# Patient Record
Sex: MALE | NOT HISPANIC OR LATINO
[De-identification: names, ages, dates, MRNs, and addresses within clinical notes are randomized per-mention and may not be internally consistent; named-entity substitution may affect disease eponyms.]

---

## 2017-12-13 PROBLEM — Z00.00 ENCOUNTER FOR PREVENTIVE HEALTH EXAMINATION: Status: ACTIVE | Noted: 2017-12-13

## 2018-01-04 ENCOUNTER — APPOINTMENT (OUTPATIENT)
Dept: UROLOGY | Facility: CLINIC | Age: 68
End: 2018-01-04
Payer: MEDICARE

## 2018-01-04 VITALS
HEART RATE: 105 BPM | BODY MASS INDEX: 26.49 KG/M2 | HEIGHT: 65 IN | WEIGHT: 159 LBS | SYSTOLIC BLOOD PRESSURE: 149 MMHG | RESPIRATION RATE: 17 BRPM | TEMPERATURE: 99.4 F | DIASTOLIC BLOOD PRESSURE: 88 MMHG

## 2018-01-04 DIAGNOSIS — Z80.8 FAMILY HISTORY OF MALIGNANT NEOPLASM OF OTHER ORGANS OR SYSTEMS: ICD-10-CM

## 2018-01-04 DIAGNOSIS — Z86.39 PERSONAL HISTORY OF OTHER ENDOCRINE, NUTRITIONAL AND METABOLIC DISEASE: ICD-10-CM

## 2018-01-04 DIAGNOSIS — R82.99 OTHER ABNORMAL FINDINGS IN URINE: ICD-10-CM

## 2018-01-04 DIAGNOSIS — Z86.79 PERSONAL HISTORY OF OTHER DISEASES OF THE CIRCULATORY SYSTEM: ICD-10-CM

## 2018-01-04 DIAGNOSIS — Z87.09 PERSONAL HISTORY OF OTHER DISEASES OF THE RESPIRATORY SYSTEM: ICD-10-CM

## 2018-01-04 DIAGNOSIS — Z80.1 FAMILY HISTORY OF MALIGNANT NEOPLASM OF TRACHEA, BRONCHUS AND LUNG: ICD-10-CM

## 2018-01-04 DIAGNOSIS — Z82.49 FAMILY HISTORY OF ISCHEMIC HEART DISEASE AND OTHER DISEASES OF THE CIRCULATORY SYSTEM: ICD-10-CM

## 2018-01-04 DIAGNOSIS — Z78.9 OTHER SPECIFIED HEALTH STATUS: ICD-10-CM

## 2018-01-04 DIAGNOSIS — Z87.891 PERSONAL HISTORY OF NICOTINE DEPENDENCE: ICD-10-CM

## 2018-01-04 PROCEDURE — 99204 OFFICE O/P NEW MOD 45 MIN: CPT

## 2018-01-04 RX ORDER — SAXAGLIPTIN AND METFORMIN HYDROCHLORIDE 5; 1000 MG/1; MG/1
5-1000 TABLET, FILM COATED, EXTENDED RELEASE ORAL
Refills: 0 | Status: ACTIVE | COMMUNITY

## 2018-01-04 RX ORDER — TURMERIC ROOT EXTRACT 500 MG
TABLET ORAL
Refills: 0 | Status: ACTIVE | COMMUNITY

## 2018-01-04 RX ORDER — LOSARTAN POTASSIUM 100 MG/1
100 TABLET, FILM COATED ORAL
Refills: 0 | Status: ACTIVE | COMMUNITY

## 2018-01-04 RX ORDER — UBIDECARENONE/VIT E ACET 100MG-5
1000 CAPSULE ORAL
Refills: 0 | Status: ACTIVE | COMMUNITY

## 2018-01-04 RX ORDER — UBIQUINOL 100 MG
CAPSULE ORAL
Refills: 0 | Status: ACTIVE | COMMUNITY

## 2018-01-04 RX ORDER — ATORVASTATIN CALCIUM 10 MG/1
10 TABLET, FILM COATED ORAL
Refills: 0 | Status: ACTIVE | COMMUNITY

## 2018-01-04 RX ORDER — ALLOPURINOL 300 MG/1
300 TABLET ORAL
Refills: 0 | Status: ACTIVE | COMMUNITY

## 2018-01-04 RX ORDER — AMLODIPINE BESYLATE 10 MG/1
10 TABLET ORAL
Refills: 0 | Status: ACTIVE | COMMUNITY

## 2018-01-16 ENCOUNTER — APPOINTMENT (OUTPATIENT)
Dept: UROLOGY | Facility: CLINIC | Age: 68
End: 2018-01-16
Payer: MEDICARE

## 2018-01-16 ENCOUNTER — OUTPATIENT (OUTPATIENT)
Dept: OUTPATIENT SERVICES | Facility: HOSPITAL | Age: 68
LOS: 1 days | End: 2018-01-16
Payer: COMMERCIAL

## 2018-01-16 VITALS
RESPIRATION RATE: 16 BRPM | SYSTOLIC BLOOD PRESSURE: 155 MMHG | HEART RATE: 83 BPM | DIASTOLIC BLOOD PRESSURE: 95 MMHG | TEMPERATURE: 98.4 F

## 2018-01-16 DIAGNOSIS — R35.0 FREQUENCY OF MICTURITION: ICD-10-CM

## 2018-01-16 LAB
BILIRUB UR QL STRIP: NORMAL
CLARITY UR: CLEAR
COLLECTION METHOD: NORMAL
GLUCOSE UR-MCNC: NORMAL
HCG UR QL: 0.2 EU/DL
HGB UR QL STRIP.AUTO: NORMAL
KETONES UR-MCNC: NORMAL
LEUKOCYTE ESTERASE UR QL STRIP: NORMAL
NITRITE UR QL STRIP: NORMAL
PH UR STRIP: 6.5
PROT UR STRIP-MCNC: NORMAL
SP GR UR STRIP: 1.02

## 2018-01-16 PROCEDURE — 51797 INTRAABDOMINAL PRESSURE TEST: CPT

## 2018-01-16 PROCEDURE — 51797 INTRAABDOMINAL PRESSURE TEST: CPT | Mod: 26

## 2018-01-16 PROCEDURE — 51728 CYSTOMETROGRAM W/VP: CPT | Mod: 26

## 2018-01-16 PROCEDURE — 51741 ELECTRO-UROFLOWMETRY FIRST: CPT | Mod: 26

## 2018-01-16 PROCEDURE — 99214 OFFICE O/P EST MOD 30 MIN: CPT | Mod: 25

## 2018-01-16 PROCEDURE — 52000 CYSTOURETHROSCOPY: CPT

## 2018-01-16 PROCEDURE — 51741 ELECTRO-UROFLOWMETRY FIRST: CPT

## 2018-01-16 PROCEDURE — 51784 ANAL/URINARY MUSCLE STUDY: CPT | Mod: 26

## 2018-01-16 PROCEDURE — 51784 ANAL/URINARY MUSCLE STUDY: CPT

## 2018-01-16 PROCEDURE — 51728 CYSTOMETROGRAM W/VP: CPT

## 2018-01-29 DIAGNOSIS — N40.1 BENIGN PROSTATIC HYPERPLASIA WITH LOWER URINARY TRACT SYMPTOMS: ICD-10-CM

## 2018-03-30 ENCOUNTER — OUTPATIENT (OUTPATIENT)
Dept: OUTPATIENT SERVICES | Facility: HOSPITAL | Age: 68
LOS: 1 days | End: 2018-03-30
Payer: MEDICARE

## 2018-03-30 VITALS
RESPIRATION RATE: 16 BRPM | SYSTOLIC BLOOD PRESSURE: 140 MMHG | WEIGHT: 162.04 LBS | DIASTOLIC BLOOD PRESSURE: 81 MMHG | TEMPERATURE: 97 F | HEIGHT: 64 IN | HEART RATE: 89 BPM

## 2018-03-30 DIAGNOSIS — I10 ESSENTIAL (PRIMARY) HYPERTENSION: ICD-10-CM

## 2018-03-30 DIAGNOSIS — E11.9 TYPE 2 DIABETES MELLITUS WITHOUT COMPLICATIONS: ICD-10-CM

## 2018-03-30 DIAGNOSIS — Z98.890 OTHER SPECIFIED POSTPROCEDURAL STATES: Chronic | ICD-10-CM

## 2018-03-30 DIAGNOSIS — N40.1 BENIGN PROSTATIC HYPERPLASIA WITH LOWER URINARY TRACT SYMPTOMS: ICD-10-CM

## 2018-03-30 LAB
APPEARANCE UR: CLEAR — SIGNIFICANT CHANGE UP
BILIRUB UR-MCNC: NEGATIVE — SIGNIFICANT CHANGE UP
BLD GP AB SCN SERPL QL: NEGATIVE — SIGNIFICANT CHANGE UP
BLOOD UR QL VISUAL: NEGATIVE — SIGNIFICANT CHANGE UP
BUN SERPL-MCNC: 14 MG/DL — SIGNIFICANT CHANGE UP (ref 7–23)
CALCIUM SERPL-MCNC: 8.9 MG/DL — SIGNIFICANT CHANGE UP (ref 8.4–10.5)
CHLORIDE SERPL-SCNC: 102 MMOL/L — SIGNIFICANT CHANGE UP (ref 98–107)
CO2 SERPL-SCNC: 26 MMOL/L — SIGNIFICANT CHANGE UP (ref 22–31)
COLOR SPEC: SIGNIFICANT CHANGE UP
CREAT SERPL-MCNC: 0.81 MG/DL — SIGNIFICANT CHANGE UP (ref 0.5–1.3)
GLUCOSE SERPL-MCNC: 122 MG/DL — HIGH (ref 70–99)
GLUCOSE UR-MCNC: NEGATIVE — SIGNIFICANT CHANGE UP
HBA1C BLD-MCNC: 6.2 % — HIGH (ref 4–5.6)
HCT VFR BLD CALC: 41.4 % — SIGNIFICANT CHANGE UP (ref 39–50)
HGB BLD-MCNC: 13.9 G/DL — SIGNIFICANT CHANGE UP (ref 13–17)
KETONES UR-MCNC: NEGATIVE — SIGNIFICANT CHANGE UP
LEUKOCYTE ESTERASE UR-ACNC: NEGATIVE — SIGNIFICANT CHANGE UP
MCHC RBC-ENTMCNC: 30.5 PG — SIGNIFICANT CHANGE UP (ref 27–34)
MCHC RBC-ENTMCNC: 33.6 % — SIGNIFICANT CHANGE UP (ref 32–36)
MCV RBC AUTO: 90.8 FL — SIGNIFICANT CHANGE UP (ref 80–100)
MUCOUS THREADS # UR AUTO: SIGNIFICANT CHANGE UP
NITRITE UR-MCNC: NEGATIVE — SIGNIFICANT CHANGE UP
NRBC # FLD: 0 — SIGNIFICANT CHANGE UP
PH UR: 6 — SIGNIFICANT CHANGE UP (ref 4.6–8)
PLATELET # BLD AUTO: 237 K/UL — SIGNIFICANT CHANGE UP (ref 150–400)
PMV BLD: 10.5 FL — SIGNIFICANT CHANGE UP (ref 7–13)
POTASSIUM SERPL-MCNC: 3.6 MMOL/L — SIGNIFICANT CHANGE UP (ref 3.5–5.3)
POTASSIUM SERPL-SCNC: 3.6 MMOL/L — SIGNIFICANT CHANGE UP (ref 3.5–5.3)
PROT UR-MCNC: NEGATIVE MG/DL — SIGNIFICANT CHANGE UP
RBC # BLD: 4.56 M/UL — SIGNIFICANT CHANGE UP (ref 4.2–5.8)
RBC # FLD: 13 % — SIGNIFICANT CHANGE UP (ref 10.3–14.5)
RBC CASTS # UR COMP ASSIST: SIGNIFICANT CHANGE UP (ref 0–?)
RH IG SCN BLD-IMP: POSITIVE — SIGNIFICANT CHANGE UP
SODIUM SERPL-SCNC: 140 MMOL/L — SIGNIFICANT CHANGE UP (ref 135–145)
SP GR SPEC: 1.02 — SIGNIFICANT CHANGE UP (ref 1–1.04)
UROBILINOGEN FLD QL: NORMAL MG/DL — SIGNIFICANT CHANGE UP
WBC # BLD: 6.34 K/UL — SIGNIFICANT CHANGE UP (ref 3.8–10.5)
WBC # FLD AUTO: 6.34 K/UL — SIGNIFICANT CHANGE UP (ref 3.8–10.5)
WBC UR QL: SIGNIFICANT CHANGE UP (ref 0–?)

## 2018-03-30 PROCEDURE — 93010 ELECTROCARDIOGRAM REPORT: CPT

## 2018-03-30 RX ORDER — AMLODIPINE BESYLATE 2.5 MG/1
1 TABLET ORAL
Qty: 0 | Refills: 0 | COMMUNITY

## 2018-03-30 NOTE — H&P PST ADULT - NEGATIVE NEUROLOGICAL SYMPTOMS
no loss of consciousness/no transient paralysis/no tremors/no loss of sensation/no headache/no syncope/no difficulty walking/no weakness/no paresthesias/no generalized seizures/no vertigo/no focal seizures/no hemiparesis

## 2018-03-30 NOTE — H&P PST ADULT - PROBLEM SELECTOR PLAN 2
instructed patient to take losartan with a sip of water on the morning of procedure instructed patient to take losartan and amlodipine with a sip of water on the morning of procedure

## 2018-03-30 NOTE — H&P PST ADULT - PMH
2010 thyroid nodule/Mass  s/p partial thyroidectomy  BPH (Benign Prostatic Hypertrophy)    Cataracts, bilateral  small - monitored  Diabetes  type 2  Enlarged prostate with lower urinary tract symptoms (LUTS)    h/o bladder Stones  s/p lithotripsy  Hayfever    HLD (hyperlipidemia)    HTN (hypertension)    Hypercholesterolemia    Hypertension    Native language [place]--pt comprehends/verbalizes English well 2010 thyroid nodule/Mass  s/p partial thyroidectomy  BPH (Benign Prostatic Hypertrophy)    Cataracts, bilateral  small - monitored  Diabetes  type 2  Enlarged prostate with lower urinary tract symptoms (LUTS)    h/o bladder Stones  s/p lithotripsy  Hayfever    Hypercholesterolemia    Hypertension

## 2018-03-30 NOTE — H&P PST ADULT - NEGATIVE ENMT SYMPTOMS
no ear pain/no tinnitus/no nose bleeds/no hearing difficulty/no vertigo/no sinus symptoms/no throat pain/no dysphagia

## 2018-03-30 NOTE — H&P PST ADULT - NSANTHOSAYNRD_GEN_A_CORE
No. KWABENA screening performed.  STOP BANG Legend: 0-2 = LOW Risk; 3-4 = INTERMEDIATE Risk; 5-8 = HIGH Risk

## 2018-03-30 NOTE — H&P PST ADULT - RS GEN PE MLT RESP DETAILS PC
no rales/good air movement/breath sounds equal/respirations non-labored/clear to auscultation bilaterally/no wheezes/airway patent/no chest wall tenderness/no rhonchi

## 2018-03-30 NOTE — H&P PST ADULT - MUSCULOSKELETAL
details… detailed exam ROM intact/no joint swelling/no joint warmth/no calf tenderness/normal strength/no joint erythema

## 2018-03-30 NOTE — H&P PST ADULT - HISTORY OF PRESENT ILLNESS
67 year old male presents to presurgical testing with diagnosis of enlarged prostate with lower urinary tract symptoms scheduled for cystoscopy transureteral resection of prostate for 4/6/18. Pt reports Hx of BPH, diagnosed over 5 years ago, with persistent symptoms despite conservative management with medication, associated with urinary frequency, nocturia, and incomplete bladder emptying. Pt denies UTI's hematuria or flank pain.

## 2018-03-30 NOTE — H&P PST ADULT - NEGATIVE OPHTHALMOLOGIC SYMPTOMS
no diplopia/no blurred vision L/no discharge L/no pain L/no pain R/no loss of vision L/no loss of vision R/no discharge R/no photophobia/no blurred vision R

## 2018-03-30 NOTE — H&P PST ADULT - NEGATIVE GENERAL SYMPTOMS
no fever/no sweating/no anorexia/no weight gain/no polyuria/no chills/no polydipsia/no weight loss/no polyphagia/no fatigue/no malaise

## 2018-03-30 NOTE — H&P PST ADULT - PROBLEM SELECTOR PLAN 1
Pt is scheduled for cystoscopy transureteral resection of prostate for 4/6/18. Preop instructions, pepcid provided. Pt stated understanding. KWABENA precaution, OR booking notified. Pending medical evaluation due to Hx of HTN DM type2 and copy of last echo and stress test. Instructed patient to make an appt with PMD

## 2018-03-30 NOTE — H&P PST ADULT - PSH
2004 and 2005 removal of bladder Stones--? possible lithotripsy    H/O partial thyroidectomy  2010  S/P Cholecystectomy

## 2018-03-30 NOTE — H&P PST ADULT - PROBLEM SELECTOR PLAN 3
take last dose of kombyglyze on 4/5/18 AM. Accu check ordered on morning of procedure. take last dose of Kombiglyze on 4/5/18 AM. Accu check ordered on morning of procedure. last dose of aspirin 3/29/18

## 2018-03-31 LAB
BACTERIA UR CULT: SIGNIFICANT CHANGE UP
SPECIMEN SOURCE: SIGNIFICANT CHANGE UP

## 2018-04-05 ENCOUNTER — TRANSCRIPTION ENCOUNTER (OUTPATIENT)
Age: 68
End: 2018-04-05

## 2018-04-05 NOTE — ASU PATIENT PROFILE, ADULT - PMH
2010 thyroid nodule/Mass  s/p partial thyroidectomy  BPH (Benign Prostatic Hypertrophy)    Cataracts, bilateral  small - monitored  Diabetes  type 2  Enlarged prostate with lower urinary tract symptoms (LUTS)    h/o bladder Stones  s/p lithotripsy  Hayfever    Hypercholesterolemia    Hypertension

## 2018-04-05 NOTE — ASU PATIENT PROFILE, ADULT - ANESTHESIA, PREVIOUS REACTION, PROFILE
denies family hx of malignant hyperthermia/nausea/vomiting denies family hx of malignant hyperthermia/none/nausea/vomiting

## 2018-04-06 ENCOUNTER — APPOINTMENT (OUTPATIENT)
Dept: UROLOGY | Facility: HOSPITAL | Age: 68
End: 2018-04-06

## 2018-04-06 ENCOUNTER — RESULT REVIEW (OUTPATIENT)
Age: 68
End: 2018-04-06

## 2018-04-06 ENCOUNTER — INPATIENT (INPATIENT)
Facility: HOSPITAL | Age: 68
LOS: 0 days | Discharge: HOME | End: 2018-04-07
Attending: UROLOGY | Admitting: UROLOGY
Payer: MEDICARE

## 2018-04-06 VITALS
SYSTOLIC BLOOD PRESSURE: 155 MMHG | HEART RATE: 95 BPM | RESPIRATION RATE: 16 BRPM | TEMPERATURE: 98 F | HEIGHT: 64 IN | OXYGEN SATURATION: 95 % | DIASTOLIC BLOOD PRESSURE: 85 MMHG | WEIGHT: 162.04 LBS

## 2018-04-06 DIAGNOSIS — N40.1 BENIGN PROSTATIC HYPERPLASIA WITH LOWER URINARY TRACT SYMPTOMS: ICD-10-CM

## 2018-04-06 DIAGNOSIS — Z98.890 OTHER SPECIFIED POSTPROCEDURAL STATES: Chronic | ICD-10-CM

## 2018-04-06 LAB
BUN SERPL-MCNC: 19 MG/DL — SIGNIFICANT CHANGE UP (ref 7–23)
CALCIUM SERPL-MCNC: 8.4 MG/DL — SIGNIFICANT CHANGE UP (ref 8.4–10.5)
CHLORIDE SERPL-SCNC: 100 MMOL/L — SIGNIFICANT CHANGE UP (ref 98–107)
CO2 SERPL-SCNC: 26 MMOL/L — SIGNIFICANT CHANGE UP (ref 22–31)
CREAT SERPL-MCNC: 1.06 MG/DL — SIGNIFICANT CHANGE UP (ref 0.5–1.3)
GLUCOSE BLDC GLUCOMTR-MCNC: 150 MG/DL — HIGH (ref 70–99)
GLUCOSE BLDV-MCNC: 142 — HIGH (ref 70–99)
GLUCOSE SERPL-MCNC: 183 MG/DL — HIGH (ref 70–99)
HCT VFR BLD CALC: 37.8 % — LOW (ref 39–50)
HGB BLD-MCNC: 12.8 G/DL — LOW (ref 13–17)
MCHC RBC-ENTMCNC: 30.5 PG — SIGNIFICANT CHANGE UP (ref 27–34)
MCHC RBC-ENTMCNC: 33.9 % — SIGNIFICANT CHANGE UP (ref 32–36)
MCV RBC AUTO: 90.2 FL — SIGNIFICANT CHANGE UP (ref 80–100)
NRBC # FLD: 0 — SIGNIFICANT CHANGE UP
PLATELET # BLD AUTO: 194 K/UL — SIGNIFICANT CHANGE UP (ref 150–400)
PMV BLD: 10.6 FL — SIGNIFICANT CHANGE UP (ref 7–13)
POTASSIUM BLDV-SCNC: 4.1 MMOL/L — SIGNIFICANT CHANGE UP (ref 3.4–4.5)
POTASSIUM SERPL-MCNC: 4.8 MMOL/L — SIGNIFICANT CHANGE UP (ref 3.5–5.3)
POTASSIUM SERPL-SCNC: 4.8 MMOL/L — SIGNIFICANT CHANGE UP (ref 3.5–5.3)
RBC # BLD: 4.19 M/UL — LOW (ref 4.2–5.8)
RBC # FLD: 12.9 % — SIGNIFICANT CHANGE UP (ref 10.3–14.5)
RH IG SCN BLD-IMP: POSITIVE — SIGNIFICANT CHANGE UP
SODIUM SERPL-SCNC: 135 MMOL/L — SIGNIFICANT CHANGE UP (ref 135–145)
WBC # BLD: 11.66 K/UL — HIGH (ref 3.8–10.5)
WBC # FLD AUTO: 11.66 K/UL — HIGH (ref 3.8–10.5)

## 2018-04-06 PROCEDURE — 88305 TISSUE EXAM BY PATHOLOGIST: CPT | Mod: 26

## 2018-04-06 RX ORDER — HEPARIN SODIUM 5000 [USP'U]/ML
5000 INJECTION INTRAVENOUS; SUBCUTANEOUS EVERY 12 HOURS
Qty: 0 | Refills: 0 | Status: DISCONTINUED | OUTPATIENT
Start: 2018-04-06 | End: 2018-04-07

## 2018-04-06 RX ORDER — DOCUSATE SODIUM 100 MG
100 CAPSULE ORAL THREE TIMES A DAY
Qty: 0 | Refills: 0 | Status: DISCONTINUED | OUTPATIENT
Start: 2018-04-06 | End: 2018-04-07

## 2018-04-06 RX ORDER — ONDANSETRON 8 MG/1
4 TABLET, FILM COATED ORAL EVERY 6 HOURS
Qty: 0 | Refills: 0 | Status: DISCONTINUED | OUTPATIENT
Start: 2018-04-06 | End: 2018-04-07

## 2018-04-06 RX ORDER — DEXTROSE 50 % IN WATER 50 %
1 SYRINGE (ML) INTRAVENOUS ONCE
Qty: 0 | Refills: 0 | Status: DISCONTINUED | OUTPATIENT
Start: 2018-04-06 | End: 2018-04-07

## 2018-04-06 RX ORDER — OXYCODONE AND ACETAMINOPHEN 5; 325 MG/1; MG/1
2 TABLET ORAL EVERY 6 HOURS
Qty: 0 | Refills: 0 | Status: DISCONTINUED | OUTPATIENT
Start: 2018-04-06 | End: 2018-04-07

## 2018-04-06 RX ORDER — SENNA PLUS 8.6 MG/1
1 TABLET ORAL AT BEDTIME
Qty: 0 | Refills: 0 | Status: DISCONTINUED | OUTPATIENT
Start: 2018-04-06 | End: 2018-04-07

## 2018-04-06 RX ORDER — SODIUM CHLORIDE 9 MG/ML
1000 INJECTION, SOLUTION INTRAVENOUS
Qty: 0 | Refills: 0 | Status: DISCONTINUED | OUTPATIENT
Start: 2018-04-06 | End: 2018-04-07

## 2018-04-06 RX ORDER — HYDROMORPHONE HYDROCHLORIDE 2 MG/ML
0.5 INJECTION INTRAMUSCULAR; INTRAVENOUS; SUBCUTANEOUS ONCE
Qty: 0 | Refills: 0 | Status: DISCONTINUED | OUTPATIENT
Start: 2018-04-06 | End: 2018-04-06

## 2018-04-06 RX ORDER — GLUCAGON INJECTION, SOLUTION 0.5 MG/.1ML
1 INJECTION, SOLUTION SUBCUTANEOUS ONCE
Qty: 0 | Refills: 0 | Status: DISCONTINUED | OUTPATIENT
Start: 2018-04-06 | End: 2018-04-07

## 2018-04-06 RX ORDER — SODIUM CHLORIDE 9 MG/ML
1000 INJECTION, SOLUTION INTRAVENOUS
Qty: 0 | Refills: 0 | Status: DISCONTINUED | OUTPATIENT
Start: 2018-04-06 | End: 2018-04-06

## 2018-04-06 RX ORDER — INSULIN LISPRO 100/ML
VIAL (ML) SUBCUTANEOUS AT BEDTIME
Qty: 0 | Refills: 0 | Status: DISCONTINUED | OUTPATIENT
Start: 2018-04-06 | End: 2018-04-07

## 2018-04-06 RX ORDER — MORPHINE SULFATE 50 MG/1
4 CAPSULE, EXTENDED RELEASE ORAL EVERY 4 HOURS
Qty: 0 | Refills: 0 | Status: DISCONTINUED | OUTPATIENT
Start: 2018-04-06 | End: 2018-04-07

## 2018-04-06 RX ORDER — CEFAZOLIN SODIUM 1 G
1000 VIAL (EA) INJECTION EVERY 8 HOURS
Qty: 0 | Refills: 0 | Status: COMPLETED | OUTPATIENT
Start: 2018-04-06 | End: 2018-04-07

## 2018-04-06 RX ORDER — DEXTROSE 50 % IN WATER 50 %
25 SYRINGE (ML) INTRAVENOUS ONCE
Qty: 0 | Refills: 0 | Status: DISCONTINUED | OUTPATIENT
Start: 2018-04-06 | End: 2018-04-07

## 2018-04-06 RX ORDER — SODIUM CHLORIDE 9 MG/ML
1000 INJECTION INTRAMUSCULAR; INTRAVENOUS; SUBCUTANEOUS
Qty: 0 | Refills: 0 | Status: DISCONTINUED | OUTPATIENT
Start: 2018-04-06 | End: 2018-04-07

## 2018-04-06 RX ORDER — DEXTROSE 50 % IN WATER 50 %
12.5 SYRINGE (ML) INTRAVENOUS ONCE
Qty: 0 | Refills: 0 | Status: DISCONTINUED | OUTPATIENT
Start: 2018-04-06 | End: 2018-04-07

## 2018-04-06 RX ORDER — INSULIN LISPRO 100/ML
VIAL (ML) SUBCUTANEOUS
Qty: 0 | Refills: 0 | Status: DISCONTINUED | OUTPATIENT
Start: 2018-04-06 | End: 2018-04-07

## 2018-04-06 RX ADMIN — MORPHINE SULFATE 4 MILLIGRAM(S): 50 CAPSULE, EXTENDED RELEASE ORAL at 21:39

## 2018-04-06 RX ADMIN — SODIUM CHLORIDE 100 MILLILITER(S): 9 INJECTION INTRAMUSCULAR; INTRAVENOUS; SUBCUTANEOUS at 21:42

## 2018-04-06 RX ADMIN — Medication 100 MILLIGRAM(S): at 21:41

## 2018-04-06 RX ADMIN — HYDROMORPHONE HYDROCHLORIDE 0.5 MILLIGRAM(S): 2 INJECTION INTRAMUSCULAR; INTRAVENOUS; SUBCUTANEOUS at 18:18

## 2018-04-06 RX ADMIN — SENNA PLUS 1 TABLET(S): 8.6 TABLET ORAL at 21:42

## 2018-04-06 RX ADMIN — MORPHINE SULFATE 4 MILLIGRAM(S): 50 CAPSULE, EXTENDED RELEASE ORAL at 21:09

## 2018-04-06 RX ADMIN — HYDROMORPHONE HYDROCHLORIDE 0.5 MILLIGRAM(S): 2 INJECTION INTRAMUSCULAR; INTRAVENOUS; SUBCUTANEOUS at 18:34

## 2018-04-06 RX ADMIN — HEPARIN SODIUM 5000 UNIT(S): 5000 INJECTION INTRAVENOUS; SUBCUTANEOUS at 21:41

## 2018-04-06 RX ADMIN — SODIUM CHLORIDE 100 MILLILITER(S): 9 INJECTION INTRAMUSCULAR; INTRAVENOUS; SUBCUTANEOUS at 23:35

## 2018-04-06 NOTE — PROGRESS NOTE ADULT - SUBJECTIVE AND OBJECTIVE BOX
Note    Post op Check    s/p : Cystoscopy, Monopolar TURP    Pt seen / examined at bedside. Pain is 3-4/10- well controlled with pain meds, located to the penile region. Has a large 24-f 3 way Pereira catheter in place on CBI- draining clear urine. Denies CP, SOB, N/V.    Vital Signs Last 24 Hrs  T(C): 36.7 (06 Apr 2018 20:00), Max: 36.7 (06 Apr 2018 20:00)  T(F): 98.1 (06 Apr 2018 20:00), Max: 98.1 (06 Apr 2018 20:00)  HR: 66 (06 Apr 2018 20:00) (66 - 95)  BP: 141/52 (06 Apr 2018 19:00) (127/79 - 155/85)  BP(mean): --  RR: 22 (06 Apr 2018 20:00) (14 - 22)  SpO2: 99% (06 Apr 2018 20:00) (95% - 100%)    I&O's Summary    06 Apr 2018 07:01  -  06 Apr 2018 21:13  --------------------------------------------------------  IN: 420 mL / OUT: 0 mL / NET: 420 mL        PHYSICAL EXAM:     Gen: AAOx3, NAD  Abd: softly distended, +BS, NT. No suprapubic tenderness.   : indwelling 3-way Pereira catheter in place- on CBI- moderate drip, clear urine.  Ext: no edema b/l.                                    12.8   11.66 )-----------( 194      ( 06 Apr 2018 17:30 )             37.8       04-06    135  |  100  |  19  ----------------------------<  183<H>  4.8   |  26  |  1.06    Ca    8.4      06 Apr 2018 17:30

## 2018-04-06 NOTE — PROGRESS NOTE ADULT - ASSESSMENT
68yo M s/p cystoscopy, Monopolar TURP, POD # 0    - Strict I&O's  - con't CBI (titrate to clear)  - Analgesics prn pain  - GI/DVT ppx  - Incentive spirometry  - Clears / OOB  - con't Ancef  - transfer to floor

## 2018-04-07 ENCOUNTER — TRANSCRIPTION ENCOUNTER (OUTPATIENT)
Age: 68
End: 2018-04-07

## 2018-04-07 VITALS
RESPIRATION RATE: 16 BRPM | OXYGEN SATURATION: 96 % | SYSTOLIC BLOOD PRESSURE: 134 MMHG | TEMPERATURE: 97 F | DIASTOLIC BLOOD PRESSURE: 80 MMHG | HEART RATE: 68 BPM

## 2018-04-07 DIAGNOSIS — N40.1 BENIGN PROSTATIC HYPERPLASIA WITH LOWER URINARY TRACT SYMPTOMS: ICD-10-CM

## 2018-04-07 LAB
GLUCOSE BLDC GLUCOMTR-MCNC: 116 MG/DL — HIGH (ref 70–99)
GLUCOSE BLDC GLUCOMTR-MCNC: 132 MG/DL — HIGH (ref 70–99)

## 2018-04-07 RX ORDER — DOCUSATE SODIUM 100 MG
1 CAPSULE ORAL
Qty: 0 | Refills: 0 | COMMUNITY
Start: 2018-04-07

## 2018-04-07 RX ORDER — SENNA PLUS 8.6 MG/1
1 TABLET ORAL
Qty: 0 | Refills: 0 | COMMUNITY
Start: 2018-04-07

## 2018-04-07 RX ORDER — ASPIRIN/CALCIUM CARB/MAGNESIUM 324 MG
1 TABLET ORAL
Qty: 0 | Refills: 0 | COMMUNITY

## 2018-04-07 RX ORDER — CEPHALEXIN 500 MG
1 CAPSULE ORAL
Qty: 6 | Refills: 0 | OUTPATIENT
Start: 2018-04-07 | End: 2018-04-09

## 2018-04-07 RX ORDER — ACETAMINOPHEN 500 MG
650 TABLET ORAL EVERY 6 HOURS
Qty: 0 | Refills: 0 | Status: DISCONTINUED | OUTPATIENT
Start: 2018-04-07 | End: 2018-04-07

## 2018-04-07 RX ORDER — SODIUM CHLORIDE 9 MG/ML
1000 INJECTION, SOLUTION INTRAVENOUS
Qty: 0 | Refills: 0 | Status: DISCONTINUED | OUTPATIENT
Start: 2018-04-07 | End: 2018-04-07

## 2018-04-07 RX ADMIN — Medication 650 MILLIGRAM(S): at 02:00

## 2018-04-07 RX ADMIN — SODIUM CHLORIDE 100 MILLILITER(S): 9 INJECTION INTRAMUSCULAR; INTRAVENOUS; SUBCUTANEOUS at 09:28

## 2018-04-07 RX ADMIN — SODIUM CHLORIDE 100 MILLILITER(S): 9 INJECTION INTRAMUSCULAR; INTRAVENOUS; SUBCUTANEOUS at 05:13

## 2018-04-07 RX ADMIN — SODIUM CHLORIDE 75 MILLILITER(S): 9 INJECTION, SOLUTION INTRAVENOUS at 10:16

## 2018-04-07 RX ADMIN — Medication 100 MILLIGRAM(S): at 05:13

## 2018-04-07 RX ADMIN — HEPARIN SODIUM 5000 UNIT(S): 5000 INJECTION INTRAVENOUS; SUBCUTANEOUS at 05:13

## 2018-04-07 RX ADMIN — Medication 650 MILLIGRAM(S): at 02:30

## 2018-04-07 NOTE — DISCHARGE NOTE ADULT - INSTRUCTIONS
Drink plenty of fluids, keep on a carbohydrate consistent diet Call MD for temperature greater than 101, pain not relieved by pain medication, difficulty urinating, if you develop nausea or begin to vomit, trouble having a bowel movement.  Please schedule a follow up appointment with your MD

## 2018-04-07 NOTE — DISCHARGE NOTE ADULT - CARE PROVIDER_API CALL
Lamont Hernandez), Urology  [address] [place] [address]  Phone: [phone]  Fax: [phone]

## 2018-04-07 NOTE — DISCHARGE NOTE ADULT - MEDICATION SUMMARY - MEDICATIONS TO TAKE
I will START or STAY ON the medications listed below when I get home from the hospital:    finasteride 5 mg oral tablet  -- 1 tab(s) by mouth once a day (at bedtime)  -- Indication: For Home med    losartan 100 mg oral tablet  -- 1 tab(s) by mouth once a day  -- Indication: For Home med    tamsulosin 0.4 mg oral capsule  -- 1 cap(s) by mouth once a day  -- Indication: For Home med    Kombiglyze XR 2.5 mg-1000 mg oral tablet, extended release  -- 1 tab(s) by mouth once a day  -- Indication: For Home med    atorvastatin 10 mg oral tablet  -- 1 tab(s) by mouth once a day (at bedtime)  -- Indication: For Home med    amLODIPine 10 mg oral tablet  -- 1 tab(s) by mouth once a day  -- Indication: For Home med    cephalexin 500 mg oral capsule  -- 1 cap(s) by mouth every 12 hours   -- Finish all this medication unless otherwise directed by prescriber.    -- Indication: For Antibiotic    senna oral tablet  -- 1 tab(s) by mouth once a day (at bedtime)  -- Indication: For Constipation    docusate sodium 100 mg oral capsule  -- 1 cap(s) by mouth 3 times a day  -- Indication: For Constipation    glucosamine 500 mg oral capsule  -- 1 cap(s) by mouth once a day  -- Indication: For Home med    Vitamin D3 1000 intl units oral capsule  -- 1 cap(s) by mouth once a day  -- Indication: For Home med

## 2018-04-07 NOTE — DISCHARGE NOTE ADULT - PATIENT PORTAL LINK FT
You can access the "[employer] Patient Portal, offered by [facility], by registering with the following website: [email or web address]

## 2018-04-07 NOTE — PROGRESS NOTE ADULT - SUBJECTIVE AND OBJECTIVE BOX
Overnight events:  None    Subjective:      Objective:    Vital signs  T(C): , Max: 36.7 (04-06-18 @ 20:00)  HR: 67 (04-07-18 @ 05:17)  BP: 121/71 (04-07-18 @ 05:17)  SpO2: 96% (04-07-18 @ 05:17)  Wt(kg): --    Output   CBI: clear      Gen  Abd      Labs: P

## 2018-04-07 NOTE — DISCHARGE NOTE ADULT - NS AS ACTIVITY OBS
No Heavy lifting/straining/Showering allowed/Walking-Indoors allowed/Walking-Outdoors allowed/Stairs allowed/Driving allowed

## 2018-04-07 NOTE — PROGRESS NOTE ADULT - PROBLEM SELECTOR PLAN 1
Clamp CBI, monitor color  TOV check PVR  Continue ancef  DVT prophy, IS  OOB, ambulate  Discharge home later today

## 2018-04-07 NOTE — DISCHARGE NOTE ADULT - PLAN OF CARE
return of good urinary flow Drink plenty of fluids.  No heavy lifting or straining for 4 to 6 weeks, avoid constipation. You may have intermittent pink tinged urine and urinary dribbling.  This is normal.   If your urine becomes bright red or with clots, please call the office.  Call Dr. Hernandez's office to schedule a follow up appointment  Call the office if you have fever greater than 101, difficulty urinating, pain not relieved with pain medication, nausea/vomiting. Continue current home medications and follow up with your primary care provider

## 2018-04-07 NOTE — PROGRESS NOTE ADULT - SUBJECTIVE AND OBJECTIVE BOX
Overnight events:  None, CBI remained clear    Subjective:  Pt offers no complaints    Objective:    Vital signs  T(C): , Max: 36.7 (04-06-18 @ 20:00)  HR: 68 (04-07-18 @ 08:35)  BP: 134/80 (04-07-18 @ 08:35)  SpO2: 96% (04-07-18 @ 08:35)  Wt(kg): --    Output     04-06 @ 07:01  -  04-07 @ 07:00  --------------------------------------------------------  IN: 670 mL / OUT: 0 mL / NET: 670 mL        Gen: NAD  Abd: soft, nontender, no suprapubic tenderness or fullness  : CBI clear    Labs: none today

## 2018-04-07 NOTE — DISCHARGE NOTE ADULT - CARE PLAN
Principal Discharge DX:	Enlarged prostate with lower urinary tract symptoms (LUTS)  Goal:	return of good urinary flow  Assessment and plan of treatment:	Drink plenty of fluids.  No heavy lifting or straining for 4 to 6 weeks, avoid constipation. You may have intermittent pink tinged urine and urinary dribbling.  This is normal.   If your urine becomes bright red or with clots, please call the office.  Call Dr. Hernandez's office to schedule a follow up appointment  Call the office if you have fever greater than 101, difficulty urinating, pain not relieved with pain medication, nausea/vomiting.  Secondary Diagnosis:	Diabetes  Assessment and plan of treatment:	Continue current home medications and follow up with your primary care provider  Secondary Diagnosis:	Hypercholesterolemia  Assessment and plan of treatment:	Continue current home medications and follow up with your primary care provider  Secondary Diagnosis:	Hypertension  Assessment and plan of treatment:	Continue current home medications and follow up with your primary care provider

## 2018-04-07 NOTE — DISCHARGE NOTE ADULT - HOSPITAL COURSE
66 yo M underwent uncomplicated TURP on 4/6/18.  CBI d/c POD #1, urine remained acceptable in color.  Pain well controlled, tolerating diet, ambulatory.  Successful TOV POD #1 with PVR of zero.  Pt d/c on POD # to f/u with Dr. Hernandez

## 2018-04-11 LAB — SURGICAL PATHOLOGY STUDY: SIGNIFICANT CHANGE UP

## 2018-04-12 ENCOUNTER — APPOINTMENT (OUTPATIENT)
Dept: UROLOGY | Facility: CLINIC | Age: 68
End: 2018-04-12
Payer: MEDICARE

## 2018-04-12 VITALS
SYSTOLIC BLOOD PRESSURE: 151 MMHG | TEMPERATURE: 98.3 F | RESPIRATION RATE: 16 BRPM | HEART RATE: 82 BPM | DIASTOLIC BLOOD PRESSURE: 89 MMHG

## 2018-04-12 PROCEDURE — 99024 POSTOP FOLLOW-UP VISIT: CPT

## 2018-04-12 RX ORDER — TAMSULOSIN HYDROCHLORIDE 0.4 MG/1
0.4 CAPSULE ORAL
Refills: 0 | Status: COMPLETED | COMMUNITY
End: 2018-04-12

## 2018-07-12 ENCOUNTER — OUTPATIENT (OUTPATIENT)
Dept: OUTPATIENT SERVICES | Facility: HOSPITAL | Age: 68
LOS: 1 days | End: 2018-07-12
Payer: COMMERCIAL

## 2018-07-12 ENCOUNTER — APPOINTMENT (OUTPATIENT)
Dept: UROLOGY | Facility: CLINIC | Age: 68
End: 2018-07-12
Payer: MEDICARE

## 2018-07-12 VITALS
RESPIRATION RATE: 16 BRPM | HEIGHT: 65 IN | HEART RATE: 89 BPM | DIASTOLIC BLOOD PRESSURE: 90 MMHG | TEMPERATURE: 97.9 F | BODY MASS INDEX: 25.66 KG/M2 | WEIGHT: 154 LBS | SYSTOLIC BLOOD PRESSURE: 166 MMHG

## 2018-07-12 DIAGNOSIS — Z98.890 OTHER SPECIFIED POSTPROCEDURAL STATES: Chronic | ICD-10-CM

## 2018-07-12 DIAGNOSIS — N21.0 CALCULUS IN BLADDER: ICD-10-CM

## 2018-07-12 PROCEDURE — 52000 CYSTOURETHROSCOPY: CPT

## 2018-07-12 PROCEDURE — 99213 OFFICE O/P EST LOW 20 MIN: CPT | Mod: 25

## 2018-07-16 LAB — BACTERIA UR CULT: NORMAL

## 2018-07-17 PROBLEM — E11.9 TYPE 2 DIABETES MELLITUS WITHOUT COMPLICATIONS: Chronic | Status: ACTIVE | Noted: 2018-03-30

## 2018-07-17 PROBLEM — N40.1 BENIGN PROSTATIC HYPERPLASIA WITH LOWER URINARY TRACT SYMPTOMS: Chronic | Status: ACTIVE | Noted: 2018-03-30

## 2018-07-17 PROBLEM — H26.9 UNSPECIFIED CATARACT: Chronic | Status: ACTIVE | Noted: 2018-03-30

## 2018-07-20 DIAGNOSIS — N21.0 CALCULUS IN BLADDER: ICD-10-CM

## 2018-07-20 DIAGNOSIS — N40.1 BENIGN PROSTATIC HYPERPLASIA WITH LOWER URINARY TRACT SYMPTOMS: ICD-10-CM

## 2018-07-27 ENCOUNTER — OUTPATIENT (OUTPATIENT)
Dept: OUTPATIENT SERVICES | Facility: HOSPITAL | Age: 68
LOS: 1 days | End: 2018-07-27

## 2018-07-27 VITALS
DIASTOLIC BLOOD PRESSURE: 80 MMHG | WEIGHT: 156.97 LBS | SYSTOLIC BLOOD PRESSURE: 138 MMHG | RESPIRATION RATE: 16 BRPM | TEMPERATURE: 99 F | OXYGEN SATURATION: 96 % | HEIGHT: 63.5 IN | HEART RATE: 84 BPM

## 2018-07-27 DIAGNOSIS — N35.8 OTHER URETHRAL STRICTURE: ICD-10-CM

## 2018-07-27 DIAGNOSIS — Z90.79 ACQUIRED ABSENCE OF OTHER GENITAL ORGAN(S): Chronic | ICD-10-CM

## 2018-07-27 DIAGNOSIS — E78.00 PURE HYPERCHOLESTEROLEMIA, UNSPECIFIED: ICD-10-CM

## 2018-07-27 DIAGNOSIS — Z98.890 OTHER SPECIFIED POSTPROCEDURAL STATES: Chronic | ICD-10-CM

## 2018-07-27 DIAGNOSIS — N21.0 CALCULUS IN BLADDER: ICD-10-CM

## 2018-07-27 DIAGNOSIS — I10 ESSENTIAL (PRIMARY) HYPERTENSION: ICD-10-CM

## 2018-07-27 DIAGNOSIS — E11.9 TYPE 2 DIABETES MELLITUS WITHOUT COMPLICATIONS: ICD-10-CM

## 2018-07-27 DIAGNOSIS — G47.33 OBSTRUCTIVE SLEEP APNEA (ADULT) (PEDIATRIC): ICD-10-CM

## 2018-07-27 DIAGNOSIS — N40.0 BENIGN PROSTATIC HYPERPLASIA WITHOUT LOWER URINARY TRACT SYMPTOMS: ICD-10-CM

## 2018-07-27 DIAGNOSIS — E89.0 POSTPROCEDURAL HYPOTHYROIDISM: Chronic | ICD-10-CM

## 2018-07-27 LAB
APPEARANCE UR: CLEAR — SIGNIFICANT CHANGE UP
BILIRUB UR-MCNC: NEGATIVE — SIGNIFICANT CHANGE UP
BLOOD UR QL VISUAL: NEGATIVE — SIGNIFICANT CHANGE UP
BUN SERPL-MCNC: 8 MG/DL — SIGNIFICANT CHANGE UP (ref 7–23)
CALCIUM SERPL-MCNC: 9.5 MG/DL — SIGNIFICANT CHANGE UP (ref 8.4–10.5)
CHLORIDE SERPL-SCNC: 102 MMOL/L — SIGNIFICANT CHANGE UP (ref 98–107)
CO2 SERPL-SCNC: 26 MMOL/L — SIGNIFICANT CHANGE UP (ref 22–31)
COLOR SPEC: YELLOW — SIGNIFICANT CHANGE UP
CREAT SERPL-MCNC: 0.84 MG/DL — SIGNIFICANT CHANGE UP (ref 0.5–1.3)
GLUCOSE SERPL-MCNC: 116 MG/DL — HIGH (ref 70–99)
GLUCOSE UR-MCNC: NEGATIVE — SIGNIFICANT CHANGE UP
HBA1C BLD-MCNC: 6.2 % — HIGH (ref 4–5.6)
HCT VFR BLD CALC: 42.3 % — SIGNIFICANT CHANGE UP (ref 39–50)
HGB BLD-MCNC: 14.2 G/DL — SIGNIFICANT CHANGE UP (ref 13–17)
KETONES UR-MCNC: NEGATIVE — SIGNIFICANT CHANGE UP
LEUKOCYTE ESTERASE UR-ACNC: NEGATIVE — SIGNIFICANT CHANGE UP
MCHC RBC-ENTMCNC: 30 PG — SIGNIFICANT CHANGE UP (ref 27–34)
MCHC RBC-ENTMCNC: 33.6 % — SIGNIFICANT CHANGE UP (ref 32–36)
MCV RBC AUTO: 89.4 FL — SIGNIFICANT CHANGE UP (ref 80–100)
MUCOUS THREADS # UR AUTO: SIGNIFICANT CHANGE UP
NITRITE UR-MCNC: NEGATIVE — SIGNIFICANT CHANGE UP
NRBC # FLD: 0 — SIGNIFICANT CHANGE UP
PH UR: 5.5 — SIGNIFICANT CHANGE UP (ref 4.6–8)
PLATELET # BLD AUTO: 214 K/UL — SIGNIFICANT CHANGE UP (ref 150–400)
PMV BLD: 10.3 FL — SIGNIFICANT CHANGE UP (ref 7–13)
POTASSIUM SERPL-MCNC: 3.9 MMOL/L — SIGNIFICANT CHANGE UP (ref 3.5–5.3)
POTASSIUM SERPL-SCNC: 3.9 MMOL/L — SIGNIFICANT CHANGE UP (ref 3.5–5.3)
PROT UR-MCNC: NEGATIVE MG/DL — SIGNIFICANT CHANGE UP
RBC # BLD: 4.73 M/UL — SIGNIFICANT CHANGE UP (ref 4.2–5.8)
RBC # FLD: 12.7 % — SIGNIFICANT CHANGE UP (ref 10.3–14.5)
RBC CASTS # UR COMP ASSIST: SIGNIFICANT CHANGE UP (ref 0–?)
SODIUM SERPL-SCNC: 140 MMOL/L — SIGNIFICANT CHANGE UP (ref 135–145)
SP GR SPEC: 1.02 — SIGNIFICANT CHANGE UP (ref 1–1.04)
SQUAMOUS # UR AUTO: SIGNIFICANT CHANGE UP
UROBILINOGEN FLD QL: NORMAL MG/DL — SIGNIFICANT CHANGE UP
WBC # BLD: 5.95 K/UL — SIGNIFICANT CHANGE UP (ref 3.8–10.5)
WBC # FLD AUTO: 5.95 K/UL — SIGNIFICANT CHANGE UP (ref 3.8–10.5)
WBC UR QL: SIGNIFICANT CHANGE UP (ref 0–?)

## 2018-07-27 RX ORDER — CHOLECALCIFEROL (VITAMIN D3) 125 MCG
1 CAPSULE ORAL
Qty: 0 | Refills: 0 | COMMUNITY

## 2018-07-27 RX ORDER — TAMSULOSIN HYDROCHLORIDE 0.4 MG/1
1 CAPSULE ORAL
Qty: 0 | Refills: 0 | COMMUNITY

## 2018-07-27 RX ORDER — SODIUM CHLORIDE 9 MG/ML
1000 INJECTION, SOLUTION INTRAVENOUS
Qty: 0 | Refills: 0 | Status: DISCONTINUED | OUTPATIENT
Start: 2018-08-03 | End: 2018-08-18

## 2018-07-27 RX ORDER — LOSARTAN POTASSIUM 100 MG/1
1 TABLET, FILM COATED ORAL
Qty: 0 | Refills: 0 | COMMUNITY

## 2018-07-27 NOTE — H&P PST ADULT - MUSCULOSKELETAL
detailed exam no calf tenderness/normal strength/no joint erythema/ROM intact/no joint warmth/no joint swelling details…

## 2018-07-27 NOTE — H&P PST ADULT - NEGATIVE GASTROINTESTINAL SYMPTOMS
no nausea/no constipation/no flatulence/no steatorrhea/no abdominal pain/no vomiting/no diarrhea/no change in bowel habits/no melena/no hematochezia/no jaundice/no hiccoughs

## 2018-07-27 NOTE — H&P PST ADULT - PROBLEM SELECTOR PLAN 4
pt to continue Kombiglyze as ordered, last dose to be taken on 8/2/18 am and hold on 8/3/18. FS ordered for am dos. A1c sent and awaiting results

## 2018-07-27 NOTE — H&P PST ADULT - PSH
2004 and 2005 removal of bladder Stones--? possible lithotripsy    H/O lithotripsy  2004  H/O partial thyroidectomy    H/O partial thyroidectomy  2010  S/P Cholecystectomy    S/P TURP  4/2018

## 2018-07-27 NOTE — H&P PST ADULT - ENDOCRINE COMMENTS
Hx of partial thyroidectomy reports normal TFT's, last checked this year and wnl. Hx of diabetes type 2, reports A1C "ok" as per PMD, BS at home not checked

## 2018-07-27 NOTE — H&P PST ADULT - HISTORY OF PRESENT ILLNESS
67 year old male presents to presurgical testing with diagnosis of enlarged prostate with lower urinary tract symptoms scheduled for cystoscopy transureteral resection of prostate for 4/6/18. Pt reports Hx of BPH, diagnosed over 5 years ago, with persistent symptoms despite conservative management with medication, associated with urinary frequency, nocturia, and incomplete bladder emptying. Pt denies UTI's hematuria or flank pain. 68 year old male presents to presurgical testing with a diagnosis of calculus in bladder other urethral stricture and for a scheduled cystoscopy, retrograde urethrogram, ureteral stricture dilation on 8/3/18. Pt states since prior prostate surgeries and due to some scar tissue that developed on his urethra has been presenting some difficulty urinating such as frequency, nocturia and incomplete bladder emptying not getting better with medication regimen. Pt recommended intervention at this time.

## 2018-07-27 NOTE — H&P PST ADULT - PROBLEM SELECTOR PLAN 1
Pt is scheduled for cystoscopy transureteral resection of prostate for 4/6/18. Preop instructions, pepcid provided. Pt stated understanding. KWABENA precaution, OR booking notified. Pending medical evaluation due to Hx of HTN DM type2 and copy of last echo and stress test. Instructed patient to make an appt with PMD Pt is scheduled for a cystoscopy, retrograde urethrogram, ureteral stricture dilation on 8/3/18. Preop instructions provided including NPO status, Pepcid, no NSAIDs, no OTC herbals starting today. Verbalized understanding.  MC pending, form provided.

## 2018-07-27 NOTE — H&P PST ADULT - NEGATIVE GENERAL GENITOURINARY SYMPTOMS
no flank pain L/no urine discoloration/no bladder infections/no dysuria/no renal colic/no hematuria/no flank pain R/no incontinence/normal libido

## 2018-07-27 NOTE — H&P PST ADULT - PROBLEM SELECTOR PLAN 3
take last dose of Kombiglyze on 4/5/18 AM. Accu check ordered on morning of procedure. last dose of aspirin 3/29/18 Continue Atorvastatin Qhs as ordered

## 2018-07-27 NOTE — H&P PST ADULT - NEGATIVE MALE-SPECIFIC SYMPTOMS
no erectile dysfunction/no genital sores/no undescended testicle L/no urethral discharge/no scrotal mass L/no impotence/no ejaculatory dysfunction/no scrotal mass R/no undescended testicle R

## 2018-07-27 NOTE — H&P PST ADULT - GASTROINTESTINAL DETAILS
no guarding/no masses palpable/no organomegaly/no distention/bowel sounds normal/no rebound tenderness/soft/no bruit/no rigidity/nontender

## 2018-07-27 NOTE — H&P PST ADULT - NSANTHOSAYNRD_GEN_A_CORE
never tested/No. KWABENA screening performed.  STOP BANG Legend: 0-2 = LOW Risk; 3-4 = INTERMEDIATE Risk; 5-8 = HIGH Risk

## 2018-07-27 NOTE — H&P PST ADULT - RS GEN PE MLT RESP DETAILS PC
no wheezes/no intercostal retractions/no rales/airway patent/no chest wall tenderness/no rhonchi/no subcutaneous emphysema/clear to auscultation bilaterally/breath sounds equal/good air movement/respirations non-labored

## 2018-07-27 NOTE — H&P PST ADULT - NEGATIVE CARDIOVASCULAR SYMPTOMS
no dyspnea on exertion/no orthopnea/no chest pain/no palpitations/no paroxysmal nocturnal dyspnea/no peripheral edema/no claudication

## 2018-07-27 NOTE — H&P PST ADULT - NEGATIVE PSYCHIATRIC SYMPTOMS
no paranoia/no mood swings/no memory loss/no hyperactivity/no agitation/no auditory hallucinations/no insomnia/no suicidal ideation/no depression/no anxiety/no visual hallucinations

## 2018-07-27 NOTE — H&P PST ADULT - NEGATIVE GENERAL SYMPTOMS
no chills/no sweating/no weight gain/no polyphagia/no polydipsia/no weight loss/no polyuria/no fatigue/no fever/no anorexia/no malaise

## 2018-07-27 NOTE — H&P PST ADULT - NEGATIVE OPHTHALMOLOGIC SYMPTOMS
no diplopia/no blurred vision L/no blurred vision R/no discharge R/no irritation R/no discharge L/no pain L/no irritation L/no scleral injection R/no pain R/no loss of vision L/no scleral injection L/no photophobia/no loss of vision R

## 2018-07-27 NOTE — H&P PST ADULT - NEGATIVE MUSCULOSKELETAL SYMPTOMS
no neck pain/no arm pain R/no joint swelling/no muscle weakness/no stiffness/no arm pain L/no leg pain L/no myalgia/no arthritis/no leg pain R/no muscle cramps

## 2018-07-27 NOTE — H&P PST ADULT - ASSESSMENT
DX: DX: calculus in bladder other urethral stricture, scheduled for a cystoscopy, retrograde urethrogram, ureteral stricture dilation on 8/3/18

## 2018-07-27 NOTE — H&P PST ADULT - NEGATIVE SKIN SYMPTOMS
no hair loss/no rash/no tumor/no pitted nails/no change in size/color of mole/no itching/no dryness/no brittle nails

## 2018-07-27 NOTE — H&P PST ADULT - NEGATIVE ENMT SYMPTOMS
no nasal discharge/no post-nasal discharge/no nose bleeds/no dry mouth/no throat pain/no dysphagia/no nasal obstruction/no nasal congestion/no vertigo/no sinus symptoms/no hearing difficulty/no abnormal taste sensation/no gum bleeding/no ear pain/no recurrent cold sores

## 2018-07-27 NOTE — H&P PST ADULT - PROBLEM SELECTOR PLAN 2
instructed patient to take losartan and amlodipine with a sip of water on the morning of procedure Pt to continue Finasteride and tamsulosin as ordered

## 2018-07-27 NOTE — H&P PST ADULT - NEGATIVE ALLERGY TYPES
no reactions to medicines/no reactions to food/no reactions to insect bites/no reactions to animals/hay fever

## 2018-07-27 NOTE — H&P PST ADULT - NEGATIVE NEUROLOGICAL SYMPTOMS
no focal seizures/no loss of consciousness/no hemiparesis/no syncope/no vertigo/no headache/no confusion/no difficulty walking/no facial palsy/no generalized seizures/no tremors/no loss of sensation/no weakness/no transient paralysis/no paresthesias

## 2018-07-29 LAB
BACTERIA UR CULT: SIGNIFICANT CHANGE UP
SPECIMEN SOURCE: SIGNIFICANT CHANGE UP

## 2018-08-02 ENCOUNTER — TRANSCRIPTION ENCOUNTER (OUTPATIENT)
Age: 68
End: 2018-08-02

## 2018-08-03 ENCOUNTER — OUTPATIENT (OUTPATIENT)
Dept: OUTPATIENT SERVICES | Facility: HOSPITAL | Age: 68
LOS: 1 days | Discharge: HOME | End: 2018-08-03
Payer: MEDICARE

## 2018-08-03 ENCOUNTER — APPOINTMENT (OUTPATIENT)
Dept: UROLOGY | Facility: HOSPITAL | Age: 68
End: 2018-08-03

## 2018-08-03 VITALS
HEART RATE: 79 BPM | RESPIRATION RATE: 16 BRPM | DIASTOLIC BLOOD PRESSURE: 90 MMHG | TEMPERATURE: 98 F | SYSTOLIC BLOOD PRESSURE: 153 MMHG | HEIGHT: 63.5 IN | WEIGHT: 156.97 LBS | OXYGEN SATURATION: 97 %

## 2018-08-03 VITALS — OXYGEN SATURATION: 97 %

## 2018-08-03 DIAGNOSIS — Z90.79 ACQUIRED ABSENCE OF OTHER GENITAL ORGAN(S): Chronic | ICD-10-CM

## 2018-08-03 DIAGNOSIS — Z98.890 OTHER SPECIFIED POSTPROCEDURAL STATES: Chronic | ICD-10-CM

## 2018-08-03 DIAGNOSIS — N35.8 OTHER URETHRAL STRICTURE: ICD-10-CM

## 2018-08-03 DIAGNOSIS — E89.0 POSTPROCEDURAL HYPOTHYROIDISM: Chronic | ICD-10-CM

## 2018-08-03 PROCEDURE — 52281 CYSTOSCOPY AND TREATMENT: CPT

## 2018-08-03 RX ORDER — ONDANSETRON 8 MG/1
4 TABLET, FILM COATED ORAL ONCE
Qty: 0 | Refills: 0 | Status: COMPLETED | OUTPATIENT
Start: 2018-08-03 | End: 2018-08-03

## 2018-08-03 RX ORDER — LOSARTAN POTASSIUM 100 MG/1
1 TABLET, FILM COATED ORAL
Qty: 0 | Refills: 0 | COMMUNITY

## 2018-08-03 RX ORDER — SAXAGLIPTIN AND METFORMIN HYDROCHLORIDE 1000; 5 MG/1; MG/1
1 TABLET, FILM COATED, EXTENDED RELEASE ORAL
Qty: 0 | Refills: 0 | COMMUNITY

## 2018-08-03 RX ORDER — TAMSULOSIN HYDROCHLORIDE 0.4 MG/1
1 CAPSULE ORAL
Qty: 0 | Refills: 0 | COMMUNITY

## 2018-08-03 RX ORDER — FINASTERIDE 5 MG/1
1 TABLET, FILM COATED ORAL
Qty: 0 | Refills: 0 | COMMUNITY

## 2018-08-03 RX ORDER — ATORVASTATIN CALCIUM 80 MG/1
1 TABLET, FILM COATED ORAL
Qty: 0 | Refills: 0 | COMMUNITY

## 2018-08-03 RX ORDER — ACETAMINOPHEN 500 MG
1000 TABLET ORAL ONCE
Qty: 0 | Refills: 0 | Status: COMPLETED | OUTPATIENT
Start: 2018-08-03 | End: 2018-08-03

## 2018-08-03 RX ORDER — AMLODIPINE BESYLATE 2.5 MG/1
1 TABLET ORAL
Qty: 0 | Refills: 0 | COMMUNITY

## 2018-08-03 RX ORDER — ACETAMINOPHEN 500 MG
2 TABLET ORAL
Qty: 96 | Refills: 0 | OUTPATIENT
Start: 2018-08-03 | End: 2018-08-14

## 2018-08-03 RX ORDER — SODIUM CHLORIDE 9 MG/ML
1000 INJECTION, SOLUTION INTRAVENOUS ONCE
Qty: 0 | Refills: 0 | Status: COMPLETED | OUTPATIENT
Start: 2018-08-03 | End: 2018-08-03

## 2018-08-03 RX ADMIN — SODIUM CHLORIDE 30 MILLILITER(S): 9 INJECTION, SOLUTION INTRAVENOUS at 16:24

## 2018-08-03 RX ADMIN — SODIUM CHLORIDE 2000 MILLILITER(S): 9 INJECTION, SOLUTION INTRAVENOUS at 21:40

## 2018-08-03 RX ADMIN — ONDANSETRON 4 MILLIGRAM(S): 8 TABLET, FILM COATED ORAL at 21:40

## 2018-08-03 RX ADMIN — Medication 1000 MILLIGRAM(S): at 22:10

## 2018-08-03 RX ADMIN — Medication 400 MILLIGRAM(S): at 21:40

## 2018-08-03 NOTE — ASU DISCHARGE PLAN (ADULT/PEDIATRIC). - MEDICATION SUMMARY - MEDICATIONS TO TAKE
I will START or STAY ON the medications listed below when I get home from the hospital:    finasteride 5 mg oral tablet  -- 1 tab(s) by mouth once a day (at bedtime)  -- Indication: For BPH    acetaminophen 325 mg oral tablet  -- 2 tab(s) by mouth every 6 hours MDD:4 doses (8 tabs)  -- This product contains acetaminophen.  Do not use  with any other product containing acetaminophen to prevent possible liver damage.    -- Indication: For pain as needed    losartan 100 mg oral tablet  -- 1 tab(s) by mouth once a day (in the evening)  -- Indication: For HTN    tamsulosin 0.4 mg oral capsule  -- 1 cap(s) by mouth once a day (in the evening)  -- Indication: For BPH    Kombiglyze XR 5 mg-1000 mg oral tablet, extended release  -- 1 tab(s) by mouth once a day  -- Indication: For Diabetes    Kombiglyze XR 2.5 mg-1000 mg oral tablet, extended release  -- 1 tab(s) by mouth once a day  -- Indication: For Diabetes    atorvastatin 10 mg oral tablet  -- 1 tab(s) by mouth once a day (at bedtime)  -- Indication: For HLD    amLODIPine 10 mg oral tablet  -- 1 tab(s) by mouth once a day  -- Indication: For HTN

## 2018-08-03 NOTE — ASU DISCHARGE PLAN (ADULT/PEDIATRIC). - SPECIAL INSTRUCTIONS
Follow up with Dr. Hernandez on tuesday august 14th. Please call office to confirm appointment. [phone]    In order to better understand your post-operative pain control, please record the following for follow-up review:          (1) Date and time of pain         (2) Level of pain (Scale of 1-10)         (3) Medication and dose taken          (4) Level of pain (1-10) after medication taken   You may receive a phone call in order to assess if you are being appropriately treated or if there are medication adjustments you may need to better control your pain.

## 2018-08-03 NOTE — BRIEF OPERATIVE NOTE - PROCEDURE
<<-----Click on this checkbox to enter Procedure Cystoscopy  08/03/2018  with RUG and urethral dilation  Active  DANDREAS

## 2018-08-14 ENCOUNTER — APPOINTMENT (OUTPATIENT)
Dept: UROLOGY | Facility: CLINIC | Age: 68
End: 2018-08-14
Payer: MEDICARE

## 2018-08-14 PROCEDURE — 99213 OFFICE O/P EST LOW 20 MIN: CPT

## 2018-10-18 ENCOUNTER — APPOINTMENT (OUTPATIENT)
Dept: UROLOGY | Facility: CLINIC | Age: 68
End: 2018-10-18
Payer: MEDICARE

## 2018-10-18 PROCEDURE — 99213 OFFICE O/P EST LOW 20 MIN: CPT

## 2018-10-18 RX ORDER — FINASTERIDE 5 MG/1
5 TABLET, FILM COATED ORAL
Refills: 0 | Status: COMPLETED | COMMUNITY
End: 2018-10-18

## 2018-10-19 LAB
PSA FREE FLD-MCNC: 16.7
PSA FREE SERPL-MCNC: 0.03 NG/ML
PSA SERPL-MCNC: 0.18 NG/ML

## 2019-10-17 ENCOUNTER — APPOINTMENT (OUTPATIENT)
Dept: UROLOGY | Facility: CLINIC | Age: 69
End: 2019-10-17
Payer: MEDICARE

## 2019-10-17 VITALS
DIASTOLIC BLOOD PRESSURE: 80 MMHG | TEMPERATURE: 98.4 F | BODY MASS INDEX: 25.66 KG/M2 | RESPIRATION RATE: 17 BRPM | HEIGHT: 65 IN | WEIGHT: 154 LBS | HEART RATE: 86 BPM | SYSTOLIC BLOOD PRESSURE: 150 MMHG

## 2019-10-17 DIAGNOSIS — Z87.448 PERSONAL HISTORY OF OTHER DISEASES OF URINARY SYSTEM: ICD-10-CM

## 2019-10-17 PROCEDURE — 99213 OFFICE O/P EST LOW 20 MIN: CPT

## 2019-10-17 NOTE — PHYSICAL EXAM
[General Appearance - Well Developed] : well developed [Normal Appearance] : normal appearance [Well Groomed] : well groomed [General Appearance - Well Nourished] : well nourished [Abdomen Tenderness] : non-tender [Abdomen Soft] : soft [General Appearance - In No Acute Distress] : no acute distress [Heart Rate And Rhythm] : Heart rate and rhythm were normal [Urinary Bladder Findings] : the bladder was normal on palpation [Costovertebral Angle Tenderness] : no ~M costovertebral angle tenderness [Edema] : no peripheral edema [] : no respiratory distress [Respiration, Rhythm And Depth] : normal respiratory rhythm and effort [Exaggerated Use Of Accessory Muscles For Inspiration] : no accessory muscle use [Normal Station and Gait] : the gait and station were normal for the patient's age

## 2019-10-17 NOTE — HISTORY OF PRESENT ILLNESS
[FreeTextEntry1] : s/p TURP 4/6/2018\par Path: Benign prostate tissue, 17 grams.\par \par Developed bulbar stricture.  s/p cystoscopy, stricture dilation 8/3/2018.\par \par Here for annual follow up.\par Nocturia 1-2 x, good flow.  No hematuria.  No stress incontinence.  No dysuria.

## 2020-10-15 ENCOUNTER — APPOINTMENT (OUTPATIENT)
Dept: UROLOGY | Facility: CLINIC | Age: 70
End: 2020-10-15
Payer: MEDICARE

## 2020-10-15 VITALS — TEMPERATURE: 97.3 F

## 2020-10-15 DIAGNOSIS — N40.1 BENIGN PROSTATIC HYPERPLASIA WITH LOWER URINARY TRACT SYMPMS: ICD-10-CM

## 2020-10-15 DIAGNOSIS — N13.8 BENIGN PROSTATIC HYPERPLASIA WITH LOWER URINARY TRACT SYMPMS: ICD-10-CM

## 2020-10-15 PROCEDURE — 99213 OFFICE O/P EST LOW 20 MIN: CPT

## 2020-10-15 NOTE — PHYSICAL EXAM
[General Appearance - Well Developed] : well developed [Normal Appearance] : normal appearance [General Appearance - Well Nourished] : well nourished [Well Groomed] : well groomed [General Appearance - In No Acute Distress] : no acute distress [Abdomen Tenderness] : non-tender [Abdomen Soft] : soft [Costovertebral Angle Tenderness] : no ~M costovertebral angle tenderness [No Prostate Nodules] : no prostate nodules [Urinary Bladder Findings] : the bladder was normal on palpation [Prostate Size ___ gm] : prostate size [unfilled] gm

## 2020-10-15 NOTE — HISTORY OF PRESENT ILLNESS
[FreeTextEntry1] : s/p TURP 4/6/2018\par Path: Benign prostate tissue, 17 grams.\par \par Developed bulbar stricture.  s/p cystoscopy, stricture dilation 8/3/2018.\par \par Here for annual follow up.\par Doing well.  Good urinary flow with complete bladder emptying.  No gross hematuria, dysuria, or incontinence.  No abdominal pain or flank pain.  No significant changes medical history.

## 2020-10-21 LAB — PSA SERPL-MCNC: 0.93 NG/ML

## 2021-10-01 NOTE — PATIENT PROFILE ADULT. - PATIENT REPRESENTATIVE NAME
CONST: nontoxic NAD speaking in full sentences  HEAD: atraumatic  EYES: conjunctivae clear, PERRL, EOMI  ENT: mmm  NECK: supple/FROM  CARD: rrr no murmurs  CHEST: ctab no r/r/w  ABD: soft, nd, +ttp epigastric, no rebound/guarding  EXT: FROM, symmetric distal pulses intact  SKIN: warm, dry, no rash, cap refill <2sec  NEURO: a+ox3, 5/5 strength x4, gross sensation intact x4, baseline gait
Shalonda Walter

## 2021-10-19 ENCOUNTER — APPOINTMENT (OUTPATIENT)
Dept: UROLOGY | Facility: CLINIC | Age: 71
End: 2021-10-19
